# Patient Record
Sex: FEMALE
[De-identification: names, ages, dates, MRNs, and addresses within clinical notes are randomized per-mention and may not be internally consistent; named-entity substitution may affect disease eponyms.]

---

## 2019-10-22 PROBLEM — Z00.00 ENCOUNTER FOR PREVENTIVE HEALTH EXAMINATION: Status: ACTIVE | Noted: 2019-10-22

## 2019-12-20 ENCOUNTER — APPOINTMENT (OUTPATIENT)
Dept: OTOLARYNGOLOGY | Facility: CLINIC | Age: 29
End: 2019-12-20
Payer: COMMERCIAL

## 2019-12-20 VITALS
DIASTOLIC BLOOD PRESSURE: 73 MMHG | SYSTOLIC BLOOD PRESSURE: 116 MMHG | HEART RATE: 84 BPM | OXYGEN SATURATION: 98 % | WEIGHT: 170 LBS | TEMPERATURE: 97.4 F | HEIGHT: 64 IN | BODY MASS INDEX: 29.02 KG/M2

## 2019-12-20 DIAGNOSIS — J38.5 LARYNGEAL SPASM: ICD-10-CM

## 2019-12-20 DIAGNOSIS — Z87.09 PERSONAL HISTORY OF OTHER DISEASES OF THE RESPIRATORY SYSTEM: ICD-10-CM

## 2019-12-20 PROCEDURE — 99204 OFFICE O/P NEW MOD 45 MIN: CPT | Mod: 25

## 2019-12-20 PROCEDURE — 31575 DIAGNOSTIC LARYNGOSCOPY: CPT

## 2019-12-20 NOTE — HISTORY OF PRESENT ILLNESS
[de-identified] : September: allergic reaction to shrimp with lip swelling but no SOB, went to ER, gave hydrocortisone? but only took for one day, start have burning all over skin, went back to ER, gave benadryl and f/u allergist\par went to see pcp, started benadryl and steroid for 6 days, and burning went away but globus persisted, then saw GI with h/o GERD, had EGD, normal per patient but still globus and throat clearing\par stopped prilosec Nov 24th\par no voice change but intermittent neck pain\par no SOB, no change in sleeping, no dysphagia, no regurgitation\par \par otalgia has resolved\par

## 2020-04-03 ENCOUNTER — APPOINTMENT (OUTPATIENT)
Dept: OTOLARYNGOLOGY | Facility: CLINIC | Age: 30
End: 2020-04-03

## 2020-04-10 ENCOUNTER — APPOINTMENT (OUTPATIENT)
Dept: OTOLARYNGOLOGY | Facility: CLINIC | Age: 30
End: 2020-04-10
Payer: COMMERCIAL

## 2020-04-10 PROCEDURE — 99441: CPT

## 2020-04-10 NOTE — HISTORY OF PRESENT ILLNESS
[Home] : at home, [unfilled] , at the time of the visit. [Other Location: e.g. Home (Enter Location, City,State)___] : at [unfilled] [Patient] : the patient [de-identified] : took famotidine and prilosec and stopped one month ago, symptoms have gotten worse since stopping\par still has some globus sensation\par some throat clearing\par no voice change and intermittent neck pain got better with elevating head at nighttime\par no SOB, no change in sleeping, no dysphagia, no regurgitation\par no otalgia\par PATIENT REFUSED VIDEO PORTION OF CALL\par

## 2020-06-19 ENCOUNTER — APPOINTMENT (OUTPATIENT)
Dept: OTOLARYNGOLOGY | Facility: CLINIC | Age: 30
End: 2020-06-19
Payer: COMMERCIAL

## 2020-06-19 VITALS
HEART RATE: 83 BPM | TEMPERATURE: 97.9 F | DIASTOLIC BLOOD PRESSURE: 75 MMHG | HEIGHT: 64 IN | OXYGEN SATURATION: 98 % | SYSTOLIC BLOOD PRESSURE: 125 MMHG

## 2020-06-19 DIAGNOSIS — B37.0 CANDIDAL STOMATITIS: ICD-10-CM

## 2020-06-19 PROCEDURE — 31575 DIAGNOSTIC LARYNGOSCOPY: CPT

## 2020-06-19 PROCEDURE — 99214 OFFICE O/P EST MOD 30 MIN: CPT | Mod: 25

## 2020-06-19 NOTE — HISTORY OF PRESENT ILLNESS
[de-identified] : throat feels better but left neck pain\par bought wedge\par taking famotidine and prilosec, symptoms have improved since restarting\par globus sensation has improved\par decreased throat clearing\par no voice change and intermittent neck pain got better with elevating head at nighttime\par no SOB, improved sleeping except for neck pain, no dysphagia, no regurgitation\par no otalgia\par some abdominal burning\par no DM\par

## 2020-10-02 ENCOUNTER — APPOINTMENT (OUTPATIENT)
Dept: OTOLARYNGOLOGY | Facility: CLINIC | Age: 30
End: 2020-10-02
Payer: COMMERCIAL

## 2020-10-02 VITALS
HEART RATE: 82 BPM | OXYGEN SATURATION: 98 % | DIASTOLIC BLOOD PRESSURE: 75 MMHG | SYSTOLIC BLOOD PRESSURE: 121 MMHG | TEMPERATURE: 98 F | HEIGHT: 64 IN | BODY MASS INDEX: 28.68 KG/M2 | WEIGHT: 168 LBS

## 2020-10-02 PROCEDURE — 31575 DIAGNOSTIC LARYNGOSCOPY: CPT

## 2020-10-02 PROCEDURE — 99214 OFFICE O/P EST MOD 30 MIN: CPT | Mod: 25

## 2020-10-02 NOTE — PHYSICAL EXAM
[Midline] : trachea located in midline position [Normal] : no rashes [Laryngoscopy Performed] : laryngoscopy was performed, see procedure section for findings [de-identified] : clear voice

## 2020-10-02 NOTE — HISTORY OF PRESENT ILLNESS
[de-identified] : throat feels better but left neck pain\par took nystatin for about 1 week, cleared \par still taking prilosec and famotidine\par bought wedge\par no neck or ear pain\par symptoms have improved since restarting\par globus sensation has improved\par decreased throat clearing but still does it intermittently\par no voice change and intermittent neck pain got better with elevating head at nighttime\par no SOB, improved sleeping except for neck pain, no dysphagia, no regurgitation\par no otalgia\par some abdominal burning\par no DM\par

## 2020-12-20 ENCOUNTER — RX RENEWAL (OUTPATIENT)
Age: 30
End: 2020-12-20

## 2020-12-21 PROBLEM — Z87.09 HISTORY OF SORE THROAT: Status: RESOLVED | Noted: 2019-12-20 | Resolved: 2020-12-21

## 2021-07-15 NOTE — PHYSICAL EXAM

## 2021-09-23 ENCOUNTER — APPOINTMENT (OUTPATIENT)
Dept: OTOLARYNGOLOGY | Facility: CLINIC | Age: 31
End: 2021-09-23
Payer: COMMERCIAL

## 2021-09-23 VITALS — BODY MASS INDEX: 29.02 KG/M2 | HEIGHT: 64 IN | WEIGHT: 170 LBS

## 2021-09-23 PROCEDURE — 99214 OFFICE O/P EST MOD 30 MIN: CPT | Mod: 25

## 2021-09-23 PROCEDURE — 31579 LARYNGOSCOPY TELESCOPIC: CPT

## 2021-09-23 RX ORDER — FAMOTIDINE 40 MG/1
40 TABLET, FILM COATED ORAL
Qty: 90 | Refills: 0 | Status: DISCONTINUED | COMMUNITY
Start: 2019-12-20 | End: 2021-09-23

## 2021-09-23 RX ORDER — NYSTATIN 100000 [USP'U]/ML
100000 SUSPENSION ORAL 3 TIMES DAILY
Qty: 1 | Refills: 1 | Status: DISCONTINUED | COMMUNITY
Start: 2020-06-19 | End: 2021-09-23

## 2021-09-23 RX ORDER — NYSTATIN 100000 [USP'U]/ML
100000 SUSPENSION ORAL 3 TIMES DAILY
Qty: 1 | Refills: 1 | Status: DISCONTINUED | COMMUNITY
Start: 2020-10-02 | End: 2021-09-23

## 2021-09-23 RX ORDER — FAMOTIDINE 40 MG/1
40 TABLET, FILM COATED ORAL
Qty: 90 | Refills: 0 | Status: DISCONTINUED | COMMUNITY
Start: 2020-04-10 | End: 2021-09-23

## 2021-09-23 NOTE — HISTORY OF PRESENT ILLNESS
[de-identified] : 31 year old female follow up throat pain. States still has intermittent throat pain, but thinks can be due to fodds she eats. Denies dysphagia, odynophagia, aspirations, dyspnea, dysphonia. States occasional use of wedge when sleeping. States not taking reflux medications. Denies otalgia, otorrhea, ear infections, changes in hearing.

## 2021-09-23 NOTE — PHYSICAL EXAM
[Midline] : trachea located in midline position [Laryngoscopy Performed] : laryngoscopy was performed, see procedure section for findings [Normal] : orientation to person, place, and time: normal [de-identified] : mildly raspy voice

## 2022-02-03 ENCOUNTER — APPOINTMENT (OUTPATIENT)
Dept: OTOLARYNGOLOGY | Facility: CLINIC | Age: 32
End: 2022-02-03
Payer: COMMERCIAL

## 2022-02-03 VITALS
DIASTOLIC BLOOD PRESSURE: 90 MMHG | BODY MASS INDEX: 33.3 KG/M2 | HEART RATE: 77 BPM | WEIGHT: 194 LBS | SYSTOLIC BLOOD PRESSURE: 135 MMHG

## 2022-02-03 PROCEDURE — 99214 OFFICE O/P EST MOD 30 MIN: CPT | Mod: 25

## 2022-02-03 PROCEDURE — 31579 LARYNGOSCOPY TELESCOPIC: CPT

## 2022-02-03 NOTE — ADDENDUM
[FreeTextEntry1] : Documented by Ruddy Alarcon acting as scribe for Dr. Ibrahim on 02/03/2022.\par \par All Medical record entries made by the Scribe were at my, Dr. Ibrahim's, direction and personally dictated by me on 02/03/2022 . I have reviewed the chart and agree that the record accurately reflects my personal performance of the history, physical exam, assessment and plan. I have also personally directed, reviewed, and agreed with the discharge instructions.\par

## 2022-02-03 NOTE — CONSULT LETTER
[Dear  ___] : Dear  [unfilled], [Consult Letter:] : I had the pleasure of evaluating your patient, [unfilled]. [Please see my note below.] : Please see my note below. [Consult Closing:] : Thank you very much for allowing me to participate in the care of this patient.  If you have any questions, please do not hesitate to contact me. [Sincerely,] : Sincerely, [FreeTextEntry3] : Nacho Ibrahim MD, PhD\par Chief, Division of Laryngology\par Department of Otolaryngology\par [facility]\par Pediatric Otolaryngology, [facility]\par  of Otolaryngology\par [facility] [employer]\par

## 2022-02-03 NOTE — PHYSICAL EXAM
[Midline] : trachea located in midline position [Laryngoscopy Performed] : laryngoscopy was performed, see procedure section for findings [Normal] : no rashes [de-identified] : mildly raspy voice

## 2022-02-03 NOTE — HISTORY OF PRESENT ILLNESS
[de-identified] : 31 year old female here for follow up for throat pain. Patient report throat pain has improved since last visit. Reports noticing the pain again due to poor dieting. Patient reports starting Omeprazole about 2 weeks ago with relief. Denies dysphagia, odynophagia, aspirations, dyspnea, dysphonia, otalgia. Took meds until nov or december, but started back when symptoms returned.

## 2022-08-04 ENCOUNTER — APPOINTMENT (OUTPATIENT)
Dept: OTOLARYNGOLOGY | Facility: CLINIC | Age: 32
End: 2022-08-04

## 2022-08-04 VITALS
DIASTOLIC BLOOD PRESSURE: 83 MMHG | HEART RATE: 90 BPM | SYSTOLIC BLOOD PRESSURE: 129 MMHG | HEIGHT: 64 IN | BODY MASS INDEX: 33.63 KG/M2 | WEIGHT: 197 LBS

## 2022-08-04 DIAGNOSIS — K21.9 GASTRO-ESOPHAGEAL REFLUX DISEASE W/OUT ESOPHAGITIS: ICD-10-CM

## 2022-08-04 DIAGNOSIS — R09.89 OTHER SPECIFIED SYMPTOMS AND SIGNS INVOLVING THE CIRCULATORY AND RESPIRATORY SYSTEMS: ICD-10-CM

## 2022-08-04 PROCEDURE — 31579 LARYNGOSCOPY TELESCOPIC: CPT

## 2022-08-04 PROCEDURE — 99214 OFFICE O/P EST MOD 30 MIN: CPT | Mod: 25

## 2022-08-04 NOTE — PHYSICAL EXAM
[Midline] : trachea located in midline position [Laryngoscopy Performed] : laryngoscopy was performed, see procedure section for findings [Normal] : no rashes [de-identified] : mildly raspy voice

## 2022-08-04 NOTE — CONSULT LETTER
[Dear  ___] : Dear  [unfilled], [Consult Letter:] : I had the pleasure of evaluating your patient, [unfilled]. [Please see my note below.] : Please see my note below. [Consult Closing:] : Thank you very much for allowing me to participate in the care of this patient.  If you have any questions, please do not hesitate to contact me. [Sincerely,] : Sincerely, [FreeTextEntry3] : Nacho Ibrahim MD, PhD\par Chief, Division of Laryngology\par Department of Otolaryngology\par [facility]\par Pediatric Otolaryngology, [facility]\par  of Otolaryngology\par [employer] [facility] [employer]\par

## 2022-08-04 NOTE — HISTORY OF PRESENT ILLNESS
[de-identified] : 32 year old woman,  6 month follow up for reflux\par throat pain and globus sensation has improved.\par Continues to take take omeprazole 20 mg in the morning. States for a month stopped omeprazole was doing well then returned to taking it.\par Denies dysphagia, choking. \par No recent infection or fevers. \par Patient denies otalgia, otorrhea, ear infections, hearing loss, tinnitus, dizziness, vertigo, headaches related to hearing. \par overall doing well.

## 2022-08-04 NOTE — ADDENDUM
[FreeTextEntry1] : Documented by Ruddy Alarcon acting as scribe for Dr. Ibrahim on 08/04/2022.\par \par All Medical record entries made by the Scribe were at my, Dr. Ibrahim's, direction and personally dictated by me on 08/04/2022 . I have reviewed the chart and agree that the record accurately reflects my personal performance of the history, physical exam, assessment and plan. I have also personally directed, reviewed, and agreed with the discharge instructions.\par

## 2023-05-30 ENCOUNTER — APPOINTMENT (OUTPATIENT)
Dept: OTOLARYNGOLOGY | Facility: CLINIC | Age: 33
End: 2023-05-30
Payer: COMMERCIAL

## 2023-05-30 VITALS
HEART RATE: 64 BPM | SYSTOLIC BLOOD PRESSURE: 128 MMHG | HEIGHT: 64 IN | BODY MASS INDEX: 32.44 KG/M2 | DIASTOLIC BLOOD PRESSURE: 89 MMHG | WEIGHT: 190 LBS

## 2023-05-30 PROCEDURE — 99214 OFFICE O/P EST MOD 30 MIN: CPT | Mod: 25

## 2023-05-30 PROCEDURE — 31579 LARYNGOSCOPY TELESCOPIC: CPT

## 2023-05-30 RX ORDER — OMEPRAZOLE 20 MG/1
20 CAPSULE, DELAYED RELEASE ORAL TWICE DAILY
Qty: 180 | Refills: 0 | Status: ACTIVE | COMMUNITY
Start: 2021-09-23 | End: 1900-01-01

## 2023-05-30 NOTE — CONSULT LETTER
[Dear  ___] : Dear  [unfilled], [Consult Letter:] : I had the pleasure of evaluating your patient, [unfilled]. [Please see my note below.] : Please see my note below. [Consult Closing:] : Thank you very much for allowing me to participate in the care of this patient.  If you have any questions, please do not hesitate to contact me. [Sincerely,] : Sincerely, [FreeTextEntry3] : Nacho Ibrahim MD, PhD\par Chief, Division of Laryngology\par Department of Otolaryngology\par [facility]\par Pediatric Otolaryngology, [facility]\par  of Otolaryngology\par [facility] [employer]\par

## 2023-05-30 NOTE — PHYSICAL EXAM
[Midline] : trachea located in midline position [Laryngoscopy Performed] : laryngoscopy was performed, see procedure section for findings [Normal] : no rashes [de-identified] : mildly raspy voice

## 2023-05-30 NOTE — HISTORY OF PRESENT ILLNESS
[de-identified] : 33 year old woman Follow up for reflux\par States discontinued omeprazole, reflux has improved.\par Has decreased coffee intake.\par Denies dysphagia, odynophagia, choking. \par No recent infection or fevers. \par Denies otalgia, otorrhea, ear infections, hearing loss, tinnitus, dizziness, vertigo, headaches related to hearing. \par overall doing well. \par No meds for 2 months\par

## 2024-04-03 ENCOUNTER — APPOINTMENT (OUTPATIENT)
Dept: OTOLARYNGOLOGY | Facility: CLINIC | Age: 34
End: 2024-04-03

## 2024-10-23 ENCOUNTER — APPOINTMENT (OUTPATIENT)
Dept: OTOLARYNGOLOGY | Facility: CLINIC | Age: 34
End: 2024-10-23

## 2024-10-23 PROCEDURE — 31579 LARYNGOSCOPY TELESCOPIC: CPT

## 2024-10-23 PROCEDURE — 99214 OFFICE O/P EST MOD 30 MIN: CPT | Mod: 25

## 2024-10-23 RX ORDER — NYSTATIN 100000 [USP'U]/ML
100000 SUSPENSION ORAL 3 TIMES DAILY
Qty: 1 | Refills: 1 | Status: ACTIVE | COMMUNITY
Start: 2024-10-23 | End: 1900-01-01

## 2025-05-30 ENCOUNTER — APPOINTMENT (OUTPATIENT)
Dept: OTOLARYNGOLOGY | Facility: CLINIC | Age: 35
End: 2025-05-30

## 2025-05-30 VITALS — HEART RATE: 89 BPM | OXYGEN SATURATION: 98 %

## 2025-05-30 PROCEDURE — 31579 LARYNGOSCOPY TELESCOPIC: CPT

## 2025-05-30 PROCEDURE — 99214 OFFICE O/P EST MOD 30 MIN: CPT | Mod: 25
